# Patient Record
Sex: FEMALE | Race: WHITE | NOT HISPANIC OR LATINO
[De-identification: names, ages, dates, MRNs, and addresses within clinical notes are randomized per-mention and may not be internally consistent; named-entity substitution may affect disease eponyms.]

---

## 2017-08-28 ENCOUNTER — APPOINTMENT (OUTPATIENT)
Dept: OTOLARYNGOLOGY | Facility: CLINIC | Age: 72
End: 2017-08-28
Payer: MEDICARE

## 2017-08-28 VITALS
OXYGEN SATURATION: 97 % | HEART RATE: 61 BPM | SYSTOLIC BLOOD PRESSURE: 128 MMHG | DIASTOLIC BLOOD PRESSURE: 76 MMHG | TEMPERATURE: 98.6 F

## 2017-08-28 PROCEDURE — 31231 NASAL ENDOSCOPY DX: CPT

## 2017-08-28 PROCEDURE — 99203 OFFICE O/P NEW LOW 30 MIN: CPT | Mod: 25

## 2017-09-18 ENCOUNTER — APPOINTMENT (OUTPATIENT)
Dept: OTOLARYNGOLOGY | Facility: CLINIC | Age: 72
End: 2017-09-18
Payer: MEDICARE

## 2017-09-18 VITALS
HEART RATE: 64 BPM | TEMPERATURE: 98.7 F | DIASTOLIC BLOOD PRESSURE: 76 MMHG | SYSTOLIC BLOOD PRESSURE: 121 MMHG | OXYGEN SATURATION: 97 %

## 2017-09-18 DIAGNOSIS — R07.0 PAIN IN THROAT: ICD-10-CM

## 2017-09-18 DIAGNOSIS — R19.6 HALITOSIS: ICD-10-CM

## 2017-09-18 PROCEDURE — 31575 DIAGNOSTIC LARYNGOSCOPY: CPT

## 2017-09-18 PROCEDURE — 99213 OFFICE O/P EST LOW 20 MIN: CPT | Mod: 25

## 2017-09-27 ENCOUNTER — FORM ENCOUNTER (OUTPATIENT)
Age: 72
End: 2017-09-27

## 2017-09-28 ENCOUNTER — OUTPATIENT (OUTPATIENT)
Dept: OUTPATIENT SERVICES | Facility: HOSPITAL | Age: 72
LOS: 1 days | End: 2017-09-28
Payer: MEDICARE

## 2017-09-28 PROCEDURE — 76536 US EXAM OF HEAD AND NECK: CPT | Mod: 26

## 2017-09-29 ENCOUNTER — EMERGENCY (EMERGENCY)
Facility: HOSPITAL | Age: 72
LOS: 1 days | Discharge: HOME | End: 2017-09-29
Attending: PERSONAL EMERGENCY RESPONSE ATTENDANT | Admitting: PERSONAL EMERGENCY RESPONSE ATTENDANT
Payer: MEDICARE

## 2017-09-29 VITALS
TEMPERATURE: 98 F | DIASTOLIC BLOOD PRESSURE: 81 MMHG | OXYGEN SATURATION: 98 % | RESPIRATION RATE: 18 BRPM | SYSTOLIC BLOOD PRESSURE: 166 MMHG | HEART RATE: 60 BPM

## 2017-09-29 PROCEDURE — 99283 EMERGENCY DEPT VISIT LOW MDM: CPT

## 2017-09-29 RX ORDER — CEPHALEXIN 500 MG
500 CAPSULE ORAL ONCE
Qty: 0 | Refills: 0 | Status: COMPLETED | OUTPATIENT
Start: 2017-09-29 | End: 2017-09-29

## 2017-09-29 RX ORDER — CEPHALEXIN 500 MG
1 CAPSULE ORAL
Qty: 21 | Refills: 0
Start: 2017-09-29 | End: 2017-10-06

## 2017-09-29 RX ADMIN — Medication 60 MILLIGRAM(S): at 10:46

## 2017-09-29 RX ADMIN — Medication 500 MILLIGRAM(S): at 10:47

## 2017-09-29 NOTE — ED ADULT NURSE NOTE - OBJECTIVE STATEMENT
72y f pt c/o swelling/redness and itchiness to right eye s/p fig picking on wednesday; pt states woke thursday am with swelling, red and itch; pt denies drainage; no crusting; no vision changes; pt has full range of motion of eye; + perrl #3; aox3; no resp distress; no diff breath; no diff swallow; no throat closing; no chest pain; no abd pain; no n/v/d; no fever/chills; pt has lesions to neck and and bilat arms; pt neighbor a doctor and gave hydrocortisone cream did not relieve

## 2017-09-29 NOTE — ED PROVIDER NOTE - PLAN OF CARE
1.  Follow-up with opthalmology in 2-3 days.  [phone]  2.  Follow-up with primary care in 3-5 days.  3.  Follow-up with derm in November as planned.  4.  Return to ED immediately for fevers, pain with eye movement, worsening sxs, nausea, vomiting, severe headache, change in vision.

## 2017-09-29 NOTE — ED PROVIDER NOTE - CARE PLAN
Principal Discharge DX:	Poison ivy dermatitis  Instructions for follow-up, activity and diet:	1.  Follow-up with opthalmology in 2-3 days.  [phone]  2.  Follow-up with primary care in 3-5 days.  3.  Follow-up with derm in November as planned.  4.  Return to ED immediately for fevers, pain with eye movement, worsening sxs, nausea, vomiting, severe headache, change in vision.  Secondary Diagnosis:	Cellulitis, unspecified cellulitis site

## 2017-09-29 NOTE — ED PROVIDER NOTE - PHYSICAL EXAMINATION
Periorbital swelling around R eye.  No pain with eye movement in all directions, pupils equal and reactive.  + Direct and consensual reflexes.  No induration/abscess formation.  Soft tissue swelling with erythema.  L anterior neck with focus of vesicles with crusting/erythema c/w poison ivy.  Small sites of vesicles in linear/scratch pattern to R forearm L forearm.  Remains neurovascularly intact in all distal extremities. Periorbital swelling around R eye.  No pain with eye movement in all directions, pupils equal and reactive.  + Direct and consensual reflexes.  No induration/abscess formation.  Soft tissue swelling with erythema.  L anterior neck with focus of vesicles with crusting/erythema c/w poison ivy.  Small sites of vesicles in linear/scratch pattern to R forearm L forearm.  Remains neurovascularly intact in all distal extremities.  Clear breath sounds bilaterally.

## 2017-09-29 NOTE — ED PROVIDER NOTE - MEDICAL DECISION MAKING DETAILS
[place]:  Pt with contact dermatitis c/w poison ivy.  R peirorbital edema c/w preseptal cellulitis.  Lack of pain with eye movement inconsistent with orbital cellulitis.  No fevers/chills.  Pt advised that she will be txed with steroid taper, keflex for concern for bacterial superinfection surrounding R eye.  No visual changes, no drainage from R eye.  Non-contact lens wearer.  Advised to follow-up with opthalmology as oupt in 2-3 days, f/u with PCP and derm as outpt.  Return to ED immediately for fevers, pain with eye movement, worsening sxs, nausea, vomiting, severe headache, change in vision.

## 2017-09-29 NOTE — ED PROVIDER NOTE - OBJECTIVE STATEMENT
Attending MD Rodriguez.  Pt is a 72 yr old female with PMhx of melanoma untxed without chemo but excised, HTN, R hip replacement/R rotator cuff repair presenting to ED with complaint of R eye itching since Wednesday night.  By Thursday morning R eye swollen, edematous, itchy and painful.  Next door neighbor is a doctor who gave her hydrocortisone cream which did not help.  Pt went ‘fig picking’ on Wednesday morning and sxs began Wednesday night.  Now has lesions to L side of neck, bilateral neck, bilateral forearms and mid lower back.  Pt believes she may have poison Ivy as there was poison Renita around the figs.  No vision changes, no discharge.  No upper resp sxs, no sore throat.  No CP/SOB, abdominal pain, urinary or bowel changes.  ROS positive only for itching and mild cough she states is ‘allergies’.  Pt able to range both eyes without pain on EOM.  No drainage from eye.  No oral lesions/vesicles.

## 2017-10-17 ENCOUNTER — FORM ENCOUNTER (OUTPATIENT)
Age: 72
End: 2017-10-17

## 2017-10-18 ENCOUNTER — RESULT REVIEW (OUTPATIENT)
Age: 72
End: 2017-10-18

## 2017-10-18 PROCEDURE — 76942 ECHO GUIDE FOR BIOPSY: CPT | Mod: 26

## 2017-10-18 PROCEDURE — 76942 ECHO GUIDE FOR BIOPSY: CPT

## 2017-10-18 PROCEDURE — 76536 US EXAM OF HEAD AND NECK: CPT

## 2017-10-18 PROCEDURE — 10022: CPT

## 2017-10-18 PROCEDURE — 88173 CYTOPATH EVAL FNA REPORT: CPT

## 2017-10-19 LAB — NON-GYNECOLOGICAL CYTOLOGY STUDY: SIGNIFICANT CHANGE UP

## 2017-10-26 ENCOUNTER — APPOINTMENT (OUTPATIENT)
Dept: OTOLARYNGOLOGY | Facility: CLINIC | Age: 72
End: 2017-10-26
Payer: MEDICARE

## 2017-10-26 VITALS
TEMPERATURE: 97.9 F | OXYGEN SATURATION: 96 % | SYSTOLIC BLOOD PRESSURE: 139 MMHG | HEART RATE: 72 BPM | DIASTOLIC BLOOD PRESSURE: 75 MMHG

## 2017-10-26 PROCEDURE — 99213 OFFICE O/P EST LOW 20 MIN: CPT

## 2018-08-22 ENCOUNTER — FORM ENCOUNTER (OUTPATIENT)
Age: 73
End: 2018-08-22

## 2019-02-25 ENCOUNTER — APPOINTMENT (OUTPATIENT)
Dept: PULMONOLOGY | Facility: CLINIC | Age: 74
End: 2019-02-25

## 2019-03-06 PROBLEM — I10 ESSENTIAL (PRIMARY) HYPERTENSION: Chronic | Status: ACTIVE | Noted: 2017-09-29

## 2019-07-02 ENCOUNTER — APPOINTMENT (OUTPATIENT)
Dept: ENDOCRINOLOGY | Facility: CLINIC | Age: 74
End: 2019-07-02
Payer: MEDICARE

## 2019-07-02 VITALS
SYSTOLIC BLOOD PRESSURE: 124 MMHG | BODY MASS INDEX: 29.63 KG/M2 | HEIGHT: 62 IN | DIASTOLIC BLOOD PRESSURE: 78 MMHG | OXYGEN SATURATION: 98 % | WEIGHT: 161 LBS | HEART RATE: 79 BPM

## 2019-07-02 DIAGNOSIS — E04.2 NONTOXIC MULTINODULAR GOITER: ICD-10-CM

## 2019-07-02 DIAGNOSIS — K80.20 CALCULUS OF GALLBLADDER W/OUT CHOLECYSTITIS W/OUT OBSTRUCTION: ICD-10-CM

## 2019-07-02 DIAGNOSIS — E04.9 NONTOXIC GOITER, UNSPECIFIED: ICD-10-CM

## 2019-07-02 DIAGNOSIS — M81.0 AGE-RELATED OSTEOPOROSIS W/OUT CURRENT PATHOLOGICAL FRACTURE: ICD-10-CM

## 2019-07-02 PROCEDURE — 99204 OFFICE O/P NEW MOD 45 MIN: CPT

## 2019-07-02 NOTE — PHYSICAL EXAM
[Well Nourished] : well nourished [No Acute Distress] : no acute distress [Alert] : alert [Well Developed] : well developed [EOMI] : extra ocular movement intact [Thyroid Not Enlarged] : the thyroid was not enlarged [No Respiratory Distress] : no respiratory distress [Normal Rate and Effort] : normal respiratory rhythm and effort [No Accessory Muscle Use] : no accessory muscle use [Clear to Auscultation] : lungs were clear to auscultation bilaterally [Normal Rate] : heart rate was normal  [Normal S1, S2] : normal S1 and S2 [Regular Rhythm] : with a regular rhythm [Normal Bowel Sounds] : normal bowel sounds [Not Tender] : non-tender [Soft] : abdomen soft [No Motor Deficits] : the motor exam was normal [Normal Insight/Judgement] : insight and judgment were intact [Normal Gait] : normal gait [Normal Affect] : the affect was normal [Normal Mood] : the mood was normal

## 2019-07-02 NOTE — CONSULT LETTER
[Dear  ___] : Dear  [unfilled], [Consult Letter:] : I had the pleasure of evaluating your patient, [unfilled]. [Please see my note below.] : Please see my note below. [Sincerely,] : Sincerely, [Consult Closing:] : Thank you very much for allowing me to participate in the care of this patient.  If you have any questions, please do not hesitate to contact me. [FreeTextEntry3] : Kelly Martin MD

## 2019-07-02 NOTE — REASON FOR VISIT
[Initial Eval - Existing Diagnosis] : an initial evaluation of an existing diagnosis [FreeTextEntry1] : Hashimoto's Disease

## 2019-07-02 NOTE — DATA REVIEWED
[FreeTextEntry1] : EXAM: US FNA # \par \par PROCEDURE DATE: 10/18/2017 \par \par INTERPRETATION: Exam: Ultrasound-guided fine needle aspiration of thyroid \par nodule performed on 10/18/2017 12:05 PM \par \par Indication: Left thyroid nodule. \par \par Correlation was made with ultrasound thyroid exam 9/20/2017 \par \par Findings and procedure: \par Informed consent was obtained with discussion of the risks, benefits, and \par alternatives of the procedure. \par \par Preliminary ultrasound images confirm the presence of a 1.1 x 0.6 cm solid \par nodule in the superior pole. Decision was made to proceed with FNA of this \par nodule. \par \par The patient was prepped and treatment in sterile fashion. Using sterile \par technique and ultrasound guidance, 1 cc of 1% lidocaine was injected into \par the overlying subcutaneous tissues. The nodule was subsequently aspirated by \par agitation. A total of 2 passes were made utilizing a 25-gauge needles, with \par needle tip position within the nodule confirmed by US at each pass. The \par resulting specimens were deemed adequate by the The cytotechnologist, and \par sent to cytology for testing. \par \par The patient tolerated the procedure well without evidence of complication. \par The patient was discharged with instructions to contact her ordering \par physician in 5-7 days in order to obtain the biopsy results. \par \par IMPRESSION: \par Uncomplicated ultrasound guided fine needle aspiration of left thyroid lobe \par nodule, as above. Results will follow from cytology. \par \par ACCESSION No:  [id number]\par \par CORNELIA HARTLEY                          2\par \par \par \par Cytopathology Report\par \par \par \par \par \par Final Diagnosis\par THYROID GLAND, LEFT LOBE UPPER POLE; FNA:\par \par BENIGN FINDINGS ([place] Category II).\par Benign follicular cells, some with oncocytic change in a\par background of colloid, consistent with a benign follicular\par nodule.\par \par The [place] System for Reporting Thyroid Cytopathology: Implied\par Risk of Malignancy and Recommended Clinical Management **\par \par Diagnostic Category Risk of Malignancy (%) Usual Management*\par \par I. Non Diagnostic 1-4 Repeat FNA with US guidance\par \par II. Benign 0-3 Clinical follow up\par \par III. Atypia of undetermined significance\par 5-15 Repeat FNA\par IV. Suspicious for a follicular neoplasm  or\par Suspicious for Hurthle cell neoplasm 15-30 Surgical\par consultation ( surgical lobectomy)\par V. Suspicious for Malignancy 60-75 Surgical consultation (Near-\par total thyroidectomy or surgical lobectomy)\par \par VI. Malignant 97-99 Surgical consultation (Near-total\par thyroidectomy)\par \par \par *Actual management may depend on other factors (e.g. clinical,\par sonographic) besides the FNA interpretation.\par **Adapted from Arely DENIS, Roshan S - Am J Clin Pathol 2009; 132: 658-\par 665\par \par ALLI Levy(ASCP)\par Hemant Cohn M.D.\par (Electronic Signature)\par Reported on: 10/19/17\par ________________________________________________________________\par \par \par Specimen Description\par THYROID, LEFT, FNA\par \par \par \par \par \par \par \par CORNELIA HARTLEY                          2\par \par \par \par Cytopathology Report\par \par \par \par \par \par Statement of Adequacy\par Satisfactory for interpretation.\par \par \par Clinical Information\par Left lobe upper pole nodule, hashimoto  (1.1 x 0.6 x 1.1cm nodule\par )\par Pass 1,2 Adequate. Adequacy given to Dr. Ruby by TAS\par 10/18/2017\par \par \par Gross Description\par Received: 30  ml of bloody fluid received in CytoLyt, 2slides air\par dried, 2 slides fixed in alcohol\par Prepared: 1 Thin Prep slide, 2 DQ, 2 Pap\par

## 2019-07-02 NOTE — HISTORY OF PRESENT ILLNESS
[FreeTextEntry1] : Patient is a 74 yo woman with history of thyroid nodule and osteoarthritis establishing new endocrine care\par \par Hashimoto's Disease diagnosed a few years ago.  Never been on thyroid medication but was told in the past she had it.  Used to see a few endocrinologists but not happy with care.  States she is losing hair at a rapid speed.  Was seen by dermatology and was told that she had alopecia.  Used to dye hair, but used to in the past.  Does not use a hair dryer.  Bowel movements are regular. Uses minoxidil- started months ago. Some cold intolerance\par Family history of thyroid cancer\par Takes fish oil, vitamin D\par Was taking biotin for some time in the past but stopped\par Diet: good, healthy; no sweet tooth. Eats meat, vegetable heavy diet\par No lithium/amiodarone/radiation exposures\par \par Patient with thyroid USGFNA October 2017 of a left upper pole thyroid nodule measuring 1.1 cm. The result was benign, [place] Category II. Has not had a surveillance thyroid US since 2017\par \par Osteoporosis: has an appointment with GYN for DXA.  No clinical fractures.  Menopause was at age 43, early\par History of right hip replacement for arthritis

## 2019-07-02 NOTE — ASSESSMENT
[FreeTextEntry1] : Patient is a 74 yo woman with hx of Hashimoto's Disease, thyroid nodules here to establish endocrine care\par \par 1. Thyroid nodule\par -patient had USGFNA in 2017, benign findings\par -no compressive symptoms\par -repeat surveillance thyroid US this year\par -referral provided\par -check TFT's including TPO antibodies\par -has hair loss and mild cold intolerance. She uses minoxidil for that at this time with minimal improvement\par \par 2. Post-menopausal osteoporosis\par -has screening DXA with GYN this year\par -asked to send over the results\par \par Follow up in 6 months

## 2019-07-02 NOTE — REVIEW OF SYSTEMS
[Hair Loss] : hair loss [Negative] : Eyes [All other systems negative] : All other systems negative [Fatigue] : no fatigue [Dysphagia] : no dysphagia [Decreased Appetite] : appetite not decreased [Dysphonia] : no dysphonia [Nasal Congestion] : no nasal congestion [Neck Pain] : no neck pain [Chest Pain] : no chest pain [Palpitations] : no palpitations [Heart Rate Is Slow] : the heart rate was not slow [Shortness Of Breath] : no shortness of breath [Heart Rate Is Fast] : the heart rate was not fast [Cough] : no cough [Wheezing] : no wheezing was heard [SOB on Exertion] : no shortness of breath during exertion [Nausea] : no nausea [Vomiting] : no vomiting was observed [Constipation] : no constipation [Diarrhea] : no diarrhea [Joint Pain] : no joint pain [Joint Stiffness] : no joint stiffness [Anxiety] : no anxiety [Depression] : no depression [Heat Intolerance] : heat tolerant [Cold Intolerance] : cold intolerant [As Noted in HPI] : as noted in HPI

## 2019-07-03 LAB
25(OH)D3 SERPL-MCNC: 26 NG/ML
ALBUMIN SERPL ELPH-MCNC: 4.5 G/DL
ALP BLD-CCNC: 52 U/L
ALT SERPL-CCNC: 14 U/L
ANION GAP SERPL CALC-SCNC: 12 MMOL/L
AST SERPL-CCNC: 13 U/L
BILIRUB SERPL-MCNC: 0.4 MG/DL
BUN SERPL-MCNC: 23 MG/DL
CALCIUM SERPL-MCNC: 10.2 MG/DL
CHLORIDE SERPL-SCNC: 102 MMOL/L
CO2 SERPL-SCNC: 25 MMOL/L
CREAT SERPL-MCNC: 0.75 MG/DL
ESTIMATED AVERAGE GLUCOSE: 97 MG/DL
GLUCOSE SERPL-MCNC: 107 MG/DL
HBA1C MFR BLD HPLC: 5 %
POTASSIUM SERPL-SCNC: 5 MMOL/L
PROT SERPL-MCNC: 6.5 G/DL
SODIUM SERPL-SCNC: 139 MMOL/L
T3 SERPL-MCNC: 87 NG/DL
T4 FREE SERPL-MCNC: 1.2 NG/DL
TSH SERPL-ACNC: 2.75 UIU/ML

## 2019-07-05 ENCOUNTER — RESULT REVIEW (OUTPATIENT)
Age: 74
End: 2019-07-05

## 2019-07-05 LAB
THYROGLOB AB SERPL-ACNC: <20 IU/ML
THYROPEROXIDASE AB SERPL IA-ACNC: <10 IU/ML

## 2019-07-11 ENCOUNTER — RESULT CHARGE (OUTPATIENT)
Age: 74
End: 2019-07-11

## 2019-10-08 ENCOUNTER — OUTPATIENT (OUTPATIENT)
Dept: OUTPATIENT SERVICES | Facility: HOSPITAL | Age: 74
LOS: 1 days | End: 2019-10-08
Payer: MEDICARE

## 2019-10-08 DIAGNOSIS — D49.0 NEOPLASM OF UNSPECIFIED BEHAVIOR OF DIGESTIVE SYSTEM: ICD-10-CM

## 2019-10-08 DIAGNOSIS — K80.50 CALCULUS OF BILE DUCT WITHOUT CHOLANGITIS OR CHOLECYSTITIS WITHOUT OBSTRUCTION: ICD-10-CM

## 2019-10-08 DIAGNOSIS — K80.20 CALCULUS OF GALLBLADDER WITHOUT CHOLECYSTITIS WITHOUT OBSTRUCTION: ICD-10-CM

## 2019-10-08 PROCEDURE — 43274 ERCP DUCT STENT PLACEMENT: CPT

## 2019-10-08 PROCEDURE — C2617: CPT

## 2019-10-08 PROCEDURE — C1769: CPT

## 2019-10-08 PROCEDURE — C1889: CPT

## 2019-10-08 PROCEDURE — C1773: CPT

## 2019-10-08 PROCEDURE — 74330 X-RAY BILE/PANC ENDOSCOPY: CPT

## 2019-10-08 PROCEDURE — 43264 ERCP REMOVE DUCT CALCULI: CPT

## 2020-01-07 ENCOUNTER — APPOINTMENT (OUTPATIENT)
Dept: ENDOCRINOLOGY | Facility: CLINIC | Age: 75
End: 2020-01-07

## 2020-11-16 ENCOUNTER — APPOINTMENT (OUTPATIENT)
Dept: ORTHOPEDIC SURGERY | Facility: CLINIC | Age: 75
End: 2020-11-16
Payer: MEDICARE

## 2020-11-16 VITALS — DIASTOLIC BLOOD PRESSURE: 77 MMHG | HEART RATE: 77 BPM | SYSTOLIC BLOOD PRESSURE: 128 MMHG

## 2020-11-16 DIAGNOSIS — M51.36 OTHER INTERVERTEBRAL DISC DEGENERATION, LUMBAR REGION: ICD-10-CM

## 2020-11-16 PROCEDURE — 99203 OFFICE O/P NEW LOW 30 MIN: CPT

## 2020-11-16 PROCEDURE — 72100 X-RAY EXAM L-S SPINE 2/3 VWS: CPT

## 2020-11-16 PROCEDURE — 72170 X-RAY EXAM OF PELVIS: CPT

## 2020-11-23 NOTE — PHYSICAL EXAM
[de-identified] : Oriented to time, place, person\par Mood: Normal\par Affect: Normal\par Appearance: Healthy, well appearing, no acute distress.\par Gait: Normal\par Assistive Devices: None \par \par Lumbar Spine Exam:\par \par Inspection: No lesions, no obvious deformity\par Palpation: No midline tenderness palpation, step-offs, or skin lesions.  Mild tenderness to palpation of the sciatic notch bilaterally.\par ROM: Restricted range of motion with respect to flexion, extension, lateral bending, and rotation.\par Strength" 5/5 IP/hip abductors/hip adductors/Q/H/EHL/TA/GS\par Hip ROM: No pain with log roll, passive internal/external rotation of the hips.\par Other tests: Negative straight leg raise, negative femoral stretch.\par Sensation: Grossly intact sensation to light touch bilateral lower extremities.\par Reflexes: 1+ patellar and Achilles reflexes. Downgoing Babinski.  Negative clonus.\par Pulses: 2+ DP and PT pulses.  [de-identified] : The following radiographs were ordered and read by me during this patients visit. I reviewed each radiograph in detail with the patient and discussed the findings as highlighted below. \par \par AP pelvis and lateral view of the right hip were obtained today, 11/16/2020, that show prior total hip arthroplasty. No acute bony injury, including fracture or dislocation. There is no hip degenerative change seen. There is no gross pelvic of hip malalignment. No significant other obvious osseous abnormality, otherwise unremarkable. \par \par 2 views of the lower lumbar were obtained today, 11/16/2020, that show no acute fracture or dislocation. There is degenerative disc disease at L5-S1 seen. There is no gross malalignment. No significant other obvious osseous abnormality, otherwise unremarkable.

## 2020-11-23 NOTE — ADDENDUM
[FreeTextEntry1] : This note was written by Terese High on 11/16/2020 acting solely as a scribe for Dr. Vern Morocho.\par \par All medical record entries made by the Scribe were at my, Dr. Vern Morocho, direction and personally dictated by me on 11/16/2020. I have personally reviewed the chart and agree that the record accurately reflects my personal performance of the history, physical exam, assessment and plan.

## 2020-11-23 NOTE — DISCUSSION/SUMMARY
[de-identified] : 76 y/o female with right leg pain. \par \par On clinical examination the patient's right hip appears minimally involved.  Range of motion of function appears normal.  Patient does have some radicular component throughout the lower extremity that is consistent with radiation of nerve pain.  Recommendation would be for continued observation and symptomatic management at this time.. \par \par Recommendations: Conservative care & observation, this includes rest/activity avoidance until less symptomatic with subsequent gradual return to full activity as tolerated. Patient may also use OTC NSAID's or acetaminophen as tolerated, with application of heat to the area 2-3x daily for 20 minutes after periods of activity. \par \par Follow-up orthopedic spine if persistent symptoms.

## 2020-11-23 NOTE — HISTORY OF PRESENT ILLNESS
[de-identified] : 75 year old female history of lumbar radiculopathy and right hip total arthroplasty 9 years ago, presents today with right hip and lower back pain since March 2020. She recalls sustained a fall in January on her right side but did not developed thigh pain until  March. The pain is brought on with prolonged walking and standing. Reports buckling sensation. Denies numbness, tingling or radiculopathy. Reports buttocks pain. \par \par The patient's past medical history, past surgical history, medications and allergies were reviewed by me today with the patient and documented accordingly. In addition, the patient's family and social history, which were noncontributory to this visit, were reviewed also.

## 2021-07-20 ENCOUNTER — EMERGENCY (EMERGENCY)
Facility: HOSPITAL | Age: 76
LOS: 1 days | Discharge: HOME | End: 2021-07-20
Attending: STUDENT IN AN ORGANIZED HEALTH CARE EDUCATION/TRAINING PROGRAM
Payer: MEDICARE

## 2021-07-20 VITALS
HEART RATE: 64 BPM | WEIGHT: 164.91 LBS | RESPIRATION RATE: 19 BRPM | TEMPERATURE: 98 F | HEIGHT: 62 IN | SYSTOLIC BLOOD PRESSURE: 142 MMHG | DIASTOLIC BLOOD PRESSURE: 62 MMHG | OXYGEN SATURATION: 97 %

## 2021-07-20 VITALS
DIASTOLIC BLOOD PRESSURE: 65 MMHG | OXYGEN SATURATION: 98 % | HEART RATE: 68 BPM | RESPIRATION RATE: 15 BRPM | SYSTOLIC BLOOD PRESSURE: 148 MMHG

## 2021-07-20 PROCEDURE — 96374 THER/PROPH/DIAG INJ IV PUSH: CPT | Mod: XU

## 2021-07-20 PROCEDURE — 73020 X-RAY EXAM OF SHOULDER: CPT

## 2021-07-20 PROCEDURE — 99284 EMERGENCY DEPT VISIT MOD MDM: CPT

## 2021-07-20 PROCEDURE — 73060 X-RAY EXAM OF HUMERUS: CPT | Mod: 26,RT

## 2021-07-20 PROCEDURE — 73030 X-RAY EXAM OF SHOULDER: CPT

## 2021-07-20 PROCEDURE — 73030 X-RAY EXAM OF SHOULDER: CPT | Mod: 26,RT,77

## 2021-07-20 PROCEDURE — 73060 X-RAY EXAM OF HUMERUS: CPT

## 2021-07-20 PROCEDURE — 23650 CLTX SHO DSLC W/MNPJ WO ANES: CPT | Mod: RT

## 2021-07-20 PROCEDURE — 99285 EMERGENCY DEPT VISIT HI MDM: CPT | Mod: 25

## 2021-07-20 PROCEDURE — 73030 X-RAY EXAM OF SHOULDER: CPT | Mod: 26,RT

## 2021-07-20 PROCEDURE — 73070 X-RAY EXAM OF ELBOW: CPT

## 2021-07-20 PROCEDURE — 73070 X-RAY EXAM OF ELBOW: CPT | Mod: 26,RT

## 2021-07-20 RX ORDER — IBUPROFEN 200 MG
600 TABLET ORAL ONCE
Refills: 0 | Status: COMPLETED | OUTPATIENT
Start: 2021-07-20 | End: 2021-07-20

## 2021-07-20 RX ORDER — OXYCODONE HYDROCHLORIDE 5 MG/1
1 TABLET ORAL
Qty: 8 | Refills: 0
Start: 2021-07-20

## 2021-07-20 RX ORDER — OXYCODONE HYDROCHLORIDE 5 MG/1
5 TABLET ORAL ONCE
Refills: 0 | Status: DISCONTINUED | OUTPATIENT
Start: 2021-07-20 | End: 2021-07-20

## 2021-07-20 RX ORDER — SODIUM CHLORIDE 9 MG/ML
1000 INJECTION INTRAMUSCULAR; INTRAVENOUS; SUBCUTANEOUS ONCE
Refills: 0 | Status: COMPLETED | OUTPATIENT
Start: 2021-07-20 | End: 2021-07-20

## 2021-07-20 RX ORDER — PROPOFOL 10 MG/ML
70 INJECTION, EMULSION INTRAVENOUS ONCE
Refills: 0 | Status: COMPLETED | OUTPATIENT
Start: 2021-07-20 | End: 2021-07-20

## 2021-07-20 RX ADMIN — OXYCODONE HYDROCHLORIDE 5 MILLIGRAM(S): 5 TABLET ORAL at 16:09

## 2021-07-20 RX ADMIN — SODIUM CHLORIDE 1000 MILLILITER(S): 9 INJECTION INTRAMUSCULAR; INTRAVENOUS; SUBCUTANEOUS at 16:50

## 2021-07-20 RX ADMIN — PROPOFOL 70 MILLIGRAM(S): 10 INJECTION, EMULSION INTRAVENOUS at 16:33

## 2021-07-20 RX ADMIN — SODIUM CHLORIDE 1000 MILLILITER(S): 9 INJECTION INTRAMUSCULAR; INTRAVENOUS; SUBCUTANEOUS at 16:51

## 2021-07-20 RX ADMIN — OXYCODONE HYDROCHLORIDE 5 MILLIGRAM(S): 5 TABLET ORAL at 14:23

## 2021-07-20 RX ADMIN — Medication 600 MILLIGRAM(S): at 16:09

## 2021-07-20 RX ADMIN — Medication 600 MILLIGRAM(S): at 12:43

## 2021-07-20 NOTE — ED ADULT NURSE NOTE - NSIMPLEMENTINTERV_GEN_ALL_ED
Implemented All Fall Risk Interventions:  [place] to call system. Call bell, personal items and telephone within reach. Instruct patient to call for assistance. Room bathroom lighting operational. Non-slip footwear when patient is off stretcher. Physically safe environment: no spills, clutter or unnecessary equipment. Stretcher in lowest position, wheels locked, appropriate side rails in place. Provide visual cue, wrist band, yellow gown, etc. Monitor gait and stability. Monitor for mental status changes and reorient to person, place, and time. Review medications for side effects contributing to fall risk. Reinforce activity limits and safety measures with patient and family.

## 2021-07-20 NOTE — ED PROVIDER NOTE - UPPER EXTREMITY EXAM, RIGHT
+ ttp to humerus and shoulder, decreased ROM of elbow/shoulder 2/2 pain, no elbow bony ttp, no clavicular ttp, no ttp of hand/wrist/forearm and full ROM of hand/wrist intact, radial pulse 2+, cap refill <3s

## 2021-07-20 NOTE — ED PROVIDER NOTE - CARE PROVIDER_API CALL
Johnny Cunha)  Orthopaedic Surgery  [address] [place] [address], [address]  [place] [address]  Phone: [phone]  Fax: [phone]  Follow Up Time: Urgent

## 2021-07-20 NOTE — ED PROVIDER NOTE - NSFOLLOWUPINSTRUCTIONS_ED_ALL_ED_FT
Keep sling in place as instructed and do not bear weight on right arm.     Apply ice 20mins on, 40mins off in cycle for first 24-48 hours.    Take ibuprofen 600mg every 8hrs for pain as needed. Take with food.   May alternate with Tylenol 650mg every 6-8 hours as needed for pain.     Take oxycodone 5mg every 8 hours as needed for severe pain *** caution SIDE EFFECT of drowsiness - DO NOT drive or drink alcohol while taking this medication.     Follow up with Orthopedic Dr. Cunha tomorrow in the office. Call [phone] to make an appointment    Return to ED for worsening pain, fever, weakness, numbness, or any other concerning symptoms.

## 2021-07-20 NOTE — ED PROVIDER NOTE - OBJECTIVE STATEMENT
77yo F with PMH HTN, R hand dominant, presenting with R upper arm pain s/p trip and fall this morning. Pt reports she tripped over a dog at the bottom of her stairs, fell onto R side. No head injury, no LOC. Reports pain to R upper arm and shoulder. Took two Tylenol arthritis 2 hours ago and reports some improvement of pain. Denies any numbness/tingling, lacerations/abrasions, any other injury.

## 2021-07-20 NOTE — ED PROVIDER NOTE - PROGRESS NOTE DETAILS
xray with fracture and dislocation - ortho will come to reduce dislocation.  Will set up for possible conscious sedation.  - Kae Hanson DO Shoulder reduction performed by ortho under conscious sedation in ED. Pt tolerated well. Sling in place. Cleared by ortho to f/u in office tomorrow. - Glo Saab PA-C Tolerating PO, feeling well, stable for d/c. - Glo Saab PA-C

## 2021-07-20 NOTE — ED PROVIDER NOTE - NEUROLOGICAL, MLM
Alert and oriented, no focal deficits, no motor or sensory deficits. Alert and oriented, no focal deficits, no sensory deficits.

## 2021-07-20 NOTE — ED ADULT TRIAGE NOTE - CHIEF COMPLAINT QUOTE
R shoulder pain/injury s/p mechanical fall this morning. Pt denies LOC, hitting her head, anti-coagulant use.

## 2021-07-20 NOTE — ED PROCEDURE NOTE - NS_POSTPROCCAREGUIDE_ED_ALL_ED
Patient is now fully awake, with vital signs and temperature stable, hydration is adequate, patients Misa’s  score is at baseline (or greater than 8), patient and escort has received  discharge education.

## 2021-07-20 NOTE — CONSULT NOTE ADULT - SUBJECTIVE AND OBJECTIVE BOX
77yo RHD Female PMH of HTN, R shoulder rotator cuff surgery 10 years ago with Dr. Lindsey, c/o R shoulder pain s/p mechanical fall. She tripped over a dog at the bottom of the stairs landing on her right arm. Patient denies head hit or LOC. Patient denies numbness or tingling in the RUE. Patient denies any other injuries.    PMH:  Hypertension      PSH: R rotator cuff surgery by Dr. Lindsey 10 years ago    AH: NKDA    Meds: See med rec    T(C): 36.7 (07-20-21 @ 11:42)  HR: 69 (07-20-21 @ 16:48)  BP: 161/91 (07-20-21 @ 16:48)  RR: 15 (07-20-21 @ 16:48)  SpO2: 100% (07-20-21 @ 16:48)  Wt(kg): --    PE RUE:  Skin intact, positive sulcus sign, no erythema or ecchymosis   SILT axillary/med/rad/ulnar  +Motor AIN/PIN/Ulnar/Radial/Musc/Median,   +painless elbow/wrist ROM,   shoulder ROM limited 2/2 pain,   2+radial pulse, soft compartments.    Secondary:  No TTP over bony landmarks, SILT BL, ROM intact BL, distal pulses palpable.    Imaging:  XR demonstrating right shoulder anterior dislocation with fracture of the greater tuberosity    Procedure:  The patient underwent conscious sedation performed by the emergency department attending physician without complication. The patient underwent closed reduction and placement of a sling and swathe. Post-procedure imaging demonstrates appropriate alignment. Patient neurovascularly intact post-procedure.     76yFemale with fracture dislocation of the right shoulder s/p closed reduction and sling and swathe.     pain control  NWB in sling and swathe  PT/OT  Ortho to sign off  Please call if you have further question  Follow up with Dr. Cunha tomorrow in the office. Call [phone] to make an appointment    Ortho [id number]

## 2021-07-20 NOTE — ED ADULT NURSE NOTE - OBJECTIVE STATEMENT
77 y/o female presents to ed s/p mechanical fall after tripping over dog. C/o right arm, hip and leg pian. Denies chest pain, sob, ha, n/v/d, abdominal pain, f/c, urinary symptoms, hematuria. A&Ox4, vss, skin warm dry and intact, MAEx4, lungs CTA, abd soft nondistended. Pt resting comfortably with VSS, no complaints at this time. Patient's bed in the lowest position, explained plan of care to patient and family members. Will continue to reassess.

## 2021-07-20 NOTE — ED ADULT TRIAGE NOTE - PAIN: PRESENCE, MLM
PSR:   Patient would like to know if there is any appointment available earlier than 09/14/2020.     Koh:   Medication request currently not on active medication list. Called patient to clarify if she is taking Levothyroxine 125 mcg or Tirosint 137. Patient is currently taking Tirosint 137 mcg. Her PCP has been monitoring her thyroid until she is able to see Endo. Per patient she is feeling very good taking the Tirosint since it is gluten free. Please advise.    complains of pain/discomfort

## 2021-07-20 NOTE — ED PROVIDER NOTE - CLINICAL SUMMARY MEDICAL DECISION MAKING FREE TEXT BOX
75yo F with PMH HTN, R hand dominant, presenting with R upper arm pain s/p trip and fall this morning. Pt reports she tripped over a dog at the bottom of her stairs, fell onto R side. No head injury, no LOC. Reports pain to R upper arm and shoulder.  +deformity to right shoulder, distal pulses and  strength in tact.  Concern for fx vs dislocation - will obrtian xrays, give pain control and reassess.  - Kae Hanson, DO

## 2021-07-20 NOTE — ED PROVIDER NOTE - PATIENT PORTAL LINK FT
You can access the FollowMyHealth Patient Portal offered by [facility] by registering at the following website: [email or web address]. By joining [facility] [employer]’s FollowMyHealth portal, you will also be able to view your health information using other applications (apps) compatible with our system.

## 2021-07-20 NOTE — ED PROVIDER NOTE - CARE PLAN
Principal Discharge DX:	Shoulder dislocation, right, initial encounter  Secondary Diagnosis:	Closed displaced fracture of greater tuberosity of right humerus, initial encounter

## 2022-07-06 NOTE — ED ADULT NURSE NOTE - FINAL NURSING ELECTRONIC SIGNATURE
20-Jul-2021 18:50 Libtayo Counseling- I discussed with the patient the risks of Libtayo including but not limited to nausea, vomiting, diarrhea, and bone or muscle pain.  The patient verbalized understanding of the proper use and possible adverse effects of Libtayo.  All of the patient's questions and concerns were addressed.

## 2022-11-09 ENCOUNTER — OUTPATIENT (OUTPATIENT)
Dept: OUTPATIENT SERVICES | Facility: HOSPITAL | Age: 77
LOS: 1 days | End: 2022-11-09
Payer: MEDICARE

## 2022-11-09 DIAGNOSIS — Z20.828 CONTACT WITH AND (SUSPECTED) EXPOSURE TO OTHER VIRAL COMMUNICABLE DISEASES: ICD-10-CM

## 2022-11-09 LAB — SARS-COV-2 RNA SPEC QL NAA+PROBE: SIGNIFICANT CHANGE UP

## 2022-11-09 PROCEDURE — U0005: CPT

## 2022-11-09 PROCEDURE — U0003: CPT

## 2022-11-10 ENCOUNTER — TRANSCRIPTION ENCOUNTER (OUTPATIENT)
Age: 77
End: 2022-11-10

## 2022-11-11 ENCOUNTER — OUTPATIENT (OUTPATIENT)
Dept: OUTPATIENT SERVICES | Facility: HOSPITAL | Age: 77
LOS: 1 days | End: 2022-11-11
Payer: MEDICARE

## 2022-11-11 ENCOUNTER — TRANSCRIPTION ENCOUNTER (OUTPATIENT)
Age: 77
End: 2022-11-11

## 2022-11-11 VITALS
TEMPERATURE: 98 F | WEIGHT: 162.7 LBS | OXYGEN SATURATION: 96 % | HEIGHT: 62 IN | RESPIRATION RATE: 14 BRPM | DIASTOLIC BLOOD PRESSURE: 56 MMHG | SYSTOLIC BLOOD PRESSURE: 129 MMHG | HEART RATE: 60 BPM

## 2022-11-11 VITALS
DIASTOLIC BLOOD PRESSURE: 83 MMHG | OXYGEN SATURATION: 98 % | SYSTOLIC BLOOD PRESSURE: 111 MMHG | HEART RATE: 67 BPM | RESPIRATION RATE: 14 BRPM

## 2022-11-11 DIAGNOSIS — Z98.49 CATARACT EXTRACTION STATUS, UNSPECIFIED EYE: Chronic | ICD-10-CM

## 2022-11-11 DIAGNOSIS — Z98.890 OTHER SPECIFIED POSTPROCEDURAL STATES: Chronic | ICD-10-CM

## 2022-11-11 DIAGNOSIS — Z41.9 ENCOUNTER FOR PROCEDURE FOR PURPOSES OTHER THAN REMEDYING HEALTH STATE, UNSPECIFIED: Chronic | ICD-10-CM

## 2022-11-11 DIAGNOSIS — Z90.49 ACQUIRED ABSENCE OF OTHER SPECIFIED PARTS OF DIGESTIVE TRACT: Chronic | ICD-10-CM

## 2022-11-11 DIAGNOSIS — H35.342 MACULAR CYST, HOLE, OR PSEUDOHOLE, LEFT EYE: ICD-10-CM

## 2022-11-11 DIAGNOSIS — Z96.649 PRESENCE OF UNSPECIFIED ARTIFICIAL HIP JOINT: Chronic | ICD-10-CM

## 2022-11-11 PROCEDURE — 67042 VIT FOR MACULAR HOLE: CPT | Mod: LT

## 2022-11-11 PROCEDURE — 67042 VIT FOR MACULAR HOLE: CPT | Mod: AS,LT

## 2022-11-11 PROCEDURE — C1889: CPT

## 2022-11-11 DEVICE — GS SF6 SULFER HEXAFLUORIDE 3 L 20GM
Type: IMPLANTABLE DEVICE | Site: LEFT | Status: NON-FUNCTIONAL
Removed: 2022-11-11

## 2022-11-11 RX ORDER — LISINOPRIL 2.5 MG/1
0 TABLET ORAL
Qty: 0 | Refills: 0 | DISCHARGE

## 2022-11-11 NOTE — ASU PATIENT PROFILE, ADULT - ABILITY TO HEAR (WITH HEARING AID OR HEARING APPLIANCE IF NORMALLY USED):
b/l hearing aid in place./Mildly to Moderately Impaired: difficulty hearing in some environments or speaker may need to increase volume or speak distinctly

## 2022-11-11 NOTE — ASU PATIENT PROFILE, ADULT - NSICDXPASTSURGICALHX_GEN_ALL_CORE_FT
PAST SURGICAL HISTORY:  Elective surgery Benign left arm tumor , bone graft from left hip.    H/O shoulder surgery right    S/P cataract surgery left eye    S/P sue     S/P ERCP with sphicterotomy with temp stent, spincterotomy, and baloon extraction of duct stones    S/P hip replacement right    Surgery, elective

## 2022-11-11 NOTE — ASU DISCHARGE PLAN (ADULT/PEDIATRIC) - CARE PROVIDER_API CALL
Janusz Wolf)  Ophthalmology  [address] [place] [address], [address]  [place] [address]  Phone: [phone]  Fax: [phone]  Scheduled Appointment: 11/12/2022 08:00 AM

## 2022-11-11 NOTE — ASU PATIENT PROFILE, ADULT - FALL HARM RISK - HARM RISK INTERVENTIONS

## 2022-11-11 NOTE — ASU DISCHARGE PLAN (ADULT/PEDIATRIC) - NS MD DC FALL RISK RISK
For information on Fall & Injury Prevention, visit: [email or web address] OR  [email or web address] OR  https://www.cdc.gov/steadi/patient.html

## 2022-11-11 NOTE — ASU PATIENT PROFILE, ADULT - VISION (WITH CORRECTIVE LENSES IF THE PATIENT USUALLY WEARS THEM):
Left eye decreased/Partially impaired: cannot see medication labels or newsprint, but can see obstacles in path, and the surrounding layout; can count fingers at arm's length

## 2022-11-11 NOTE — ASU PREOP CHECKLIST - HEART RATE (BEATS/MIN)
60 Full Thickness Lip Wedge Repair (Flap) Text: Given the location of the defect and the proximity to free margins a full thickness wedge repair was deemed most appropriate.  Using a sterile surgical marker, the appropriate repair was drawn incorporating the defect and placing the expected incisions perpendicular to the vermilion border.  The vermilion border was also meticulously outlined to ensure appropriate reapproximation during the repair.  The area thus outlined was incised through and through with a #15 scalpel blade.  The muscularis and dermis were reaproximated with deep sutures following hemostasis. Care was taken to realign the vermilion border before proceeding with the superficial closure.  Once the vermilion was realigned the superfical and mucosal closure was finished.

## 2022-11-11 NOTE — ASU PATIENT PROFILE, ADULT - NSICDXPASTMEDICALHX_GEN_ALL_CORE_FT
PAST MEDICAL HISTORY:  Bilateral hearing loss     H/O Helicobacter infection     H/O osteopenia     H/O thyroid nodule     Hypercholesterolemia     Hypertension     Melanoma left thigh    Mild asthma     HANSEL (obstructive sleep apnea) mild sleep not wearing CPAP    Osteoarthritis right hip    Pancreatic cyst     Positive PPD

## 2023-04-06 PROBLEM — C43.9 MALIGNANT MELANOMA OF SKIN, UNSPECIFIED: Chronic | Status: ACTIVE | Noted: 2022-11-11

## 2023-04-06 PROBLEM — Z86.39 PERSONAL HISTORY OF OTHER ENDOCRINE, NUTRITIONAL AND METABOLIC DISEASE: Chronic | Status: ACTIVE | Noted: 2022-11-11

## 2023-04-06 PROBLEM — K86.2 CYST OF PANCREAS: Chronic | Status: ACTIVE | Noted: 2022-11-11

## 2023-04-06 PROBLEM — H91.93 UNSPECIFIED HEARING LOSS, BILATERAL: Chronic | Status: ACTIVE | Noted: 2022-11-11

## 2023-04-06 PROBLEM — R76.11 NONSPECIFIC REACTION TO TUBERCULIN SKIN TEST WITHOUT ACTIVE TUBERCULOSIS: Chronic | Status: ACTIVE | Noted: 2022-11-11

## 2023-04-06 PROBLEM — Z86.19 PERSONAL HISTORY OF OTHER INFECTIOUS AND PARASITIC DISEASES: Chronic | Status: ACTIVE | Noted: 2022-11-11

## 2023-04-06 PROBLEM — M19.90 UNSPECIFIED OSTEOARTHRITIS, UNSPECIFIED SITE: Chronic | Status: ACTIVE | Noted: 2022-11-11

## 2023-04-06 PROBLEM — G47.33 OBSTRUCTIVE SLEEP APNEA (ADULT) (PEDIATRIC): Chronic | Status: ACTIVE | Noted: 2022-11-11

## 2023-04-06 PROBLEM — J45.909 UNSPECIFIED ASTHMA, UNCOMPLICATED: Chronic | Status: ACTIVE | Noted: 2022-11-11

## 2023-04-06 PROBLEM — E78.00 PURE HYPERCHOLESTEROLEMIA, UNSPECIFIED: Chronic | Status: ACTIVE | Noted: 2022-11-11

## 2023-04-06 PROBLEM — Z87.39 PERSONAL HISTORY OF OTHER DISEASES OF THE MUSCULOSKELETAL SYSTEM AND CONNECTIVE TISSUE: Chronic | Status: ACTIVE | Noted: 2022-11-11

## 2023-04-18 ENCOUNTER — APPOINTMENT (OUTPATIENT)
Dept: PODIATRY | Facility: CLINIC | Age: 78
End: 2023-04-18
Payer: MEDICARE

## 2023-04-18 DIAGNOSIS — M79.674 PAIN IN RIGHT TOE(S): ICD-10-CM

## 2023-04-18 PROCEDURE — 11720 DEBRIDE NAIL 1-5: CPT

## 2023-04-18 PROCEDURE — 99213 OFFICE O/P EST LOW 20 MIN: CPT | Mod: 25

## 2023-04-18 RX ORDER — MELOXICAM 15 MG/1
15 TABLET ORAL
Qty: 30 | Refills: 1 | Status: COMPLETED | COMMUNITY
Start: 2020-11-16 | End: 2023-04-18

## 2023-04-18 RX ORDER — CICLOPIROX 80 MG/ML
8 SOLUTION TOPICAL
Qty: 30 | Refills: 3 | Status: ACTIVE | COMMUNITY
Start: 2023-04-18 | End: 1900-01-01

## 2023-05-01 PROBLEM — M79.674 PAIN OF TOE OF RIGHT FOOT: Status: ACTIVE | Noted: 2023-04-26

## 2023-05-01 NOTE — HISTORY OF PRESENT ILLNESS
[Sneakers] : robert [FreeTextEntry1] : Patient returns today with a complaint of fungal, yellow, thickened nails left 1, 4 and 5.  Patient states that she has had these for many years.  She has used the topicals in the past without much improvement but she did not use it consistently.  Patient states that lately she cannot a clipper that is large enough to cut the nails; she is experiencing a lot of pain when walking in shoe gear and ends up ripping her socks.

## 2023-05-01 NOTE — PHYSICAL EXAM
[2+] : left foot dorsalis pedis 2+ [No Focal Deficits] : no focal deficits [Ankle Swelling (On Exam)] : not present [Varicose Veins Of Lower Extremities] : not present [] : not present [FreeTextEntry3] : CFT: 3 seconds x 10.  Temperature gradient: warm to cool.  [de-identified] : ROM of all pedal groups is intact, non-painful, non-crepitant.  Mild hammertoes 2 and 3, bilateral.   [FreeTextEntry1] : Light touch intact.

## 2023-05-01 NOTE — ASSESSMENT
[FreeTextEntry1] : Impression: Onychomycosis, painful (B35.1) (M79).  \par \par Treatment: Discussed etiology and treatment options.  \par Patient has a physical coming up with her PCP.  I gave her a script to obtain Liver Function panel.  Patient is interested in taking oral Lamisil.  Meanwhile, will start with topical antifungal therapy and Ciclopirox was sent to her pharmacy.  \par I advised patient to clean fomites, to decrease the fungal load in her environment. \par Nails left 1, 4 and 5 were debrided of excessive thickness and length to appropriate levels of comfort and contour using sterile nippers and Dremel.   The procedure was tolerated well without complications.\par Failure to perform this treatment based on patient's underlying condition could lead to ulceration and infection.\par Return: 3 months for routine care or earlier if she gets the Liver Function panel.  \par

## 2024-05-16 ENCOUNTER — APPOINTMENT (OUTPATIENT)
Dept: PODIATRY | Facility: CLINIC | Age: 79
End: 2024-05-16
Payer: MEDICARE

## 2024-05-16 DIAGNOSIS — M20.42 OTHER HAMMER TOE(S) (ACQUIRED), RIGHT FOOT: ICD-10-CM

## 2024-05-16 DIAGNOSIS — B35.1 TINEA UNGUIUM: ICD-10-CM

## 2024-05-16 DIAGNOSIS — M79.675 PAIN IN LEFT TOE(S): ICD-10-CM

## 2024-05-16 DIAGNOSIS — M20.41 OTHER HAMMER TOE(S) (ACQUIRED), RIGHT FOOT: ICD-10-CM

## 2024-05-16 PROCEDURE — 11720 DEBRIDE NAIL 1-5: CPT

## 2024-05-16 PROCEDURE — 99212 OFFICE O/P EST SF 10 MIN: CPT | Mod: 25

## 2024-05-21 PROBLEM — M20.41 HAMMER TOES OF BOTH FEET: Status: ACTIVE | Noted: 2024-05-21

## 2024-05-21 PROBLEM — B35.1 ONYCHOMYCOSIS: Status: ACTIVE | Noted: 2023-04-18

## 2024-05-21 PROBLEM — M79.675 PAIN OF TOE OF LEFT FOOT: Status: ACTIVE | Noted: 2023-04-26

## 2024-05-23 NOTE — PHYSICAL EXAM
[2+] : left foot dorsalis pedis 2+ [Ankle Swelling (On Exam)] : not present [Varicose Veins Of Lower Extremities] : not present [] : not present [FreeTextEntry3] : CFT: 3 seconds x 10. Temperature gradient: warm to cool. Negative hair growth.  [de-identified] : Hammertoe deformities 2 to 5 bilaterally, left greater than right. They are semi-rigid. Tenderness on manipulation examination and ROM of the PIPJ's. [FreeTextEntry1] : Epicritic sensation and light touch are intact.

## 2024-05-23 NOTE — HISTORY OF PRESENT ILLNESS
[FreeTextEntry1] : Patient presents today with complaint of pain in her left big toenail, and 4th and 5th nails left foot. She states when she made the appointment, she started having pain in the toe and discomfort. She denies any drainage or trauma to the area. Upon discussing with the patient, she states she does recall wearing a tighter pair of shoes at the time which may have been causing additional pressure to the area. Patient denies any other changes in her medical conditions or pedal complaints. She states she was prescribed antifungal medications in the past, which was a topical but did not help.

## 2024-05-23 NOTE — ASSESSMENT
[FreeTextEntry1] : Impression: Onychomycosis. Pain. Hammertoe.  Treatment: Discussed findings and conditions with the patient. Discussed pedal care. She is to avoid walking barefoot.  Appropriate shoe gear modifications were discussed with the patient for her hammertoe deformities, proper fit and size with a rounder wider toe box to accommodate the deformities. She is to avoid narrow, shallow shoes.  Discussed antifungal therapy and nail therapy with the patient. Topical vs. oral with risks and benefits. She would not like to pursue any additional treatment at this time. The left hallux nail and 4th and 5th left foot were prepped and manually and mechanically debrided to patient's tolerance and appropriate length. Height of nails was decreased and smoothed with a rotary andrea. It was recommended that the patient soak her nails in a one-to-one mixture of white vinegar and water for approximately 5-10 minutes per day and cleanse lightly around with a nail brush to loosen any subungual debris. Patient was recommended also to Lysol inside all of her shoes, wear white socks and bleach with laundry. Patient is to return in approximately 2-3 months. If there is any pain, redness, problems or concerns, she is to contact the office.

## 2024-11-01 ENCOUNTER — APPOINTMENT (OUTPATIENT)
Dept: PODIATRY | Facility: CLINIC | Age: 79
End: 2024-11-01
Payer: MEDICARE

## 2024-11-01 DIAGNOSIS — M79.675 PAIN IN LEFT TOE(S): ICD-10-CM

## 2024-11-01 DIAGNOSIS — M79.674 PAIN IN RIGHT TOE(S): ICD-10-CM

## 2024-11-01 DIAGNOSIS — B35.1 TINEA UNGUIUM: ICD-10-CM

## 2024-11-01 PROCEDURE — 11720 DEBRIDE NAIL 1-5: CPT

## (undated) DEVICE — SOL BALANCE SALT 15ML

## (undated) DEVICE — SYE-CONSTELLATION MACHINE 1003466901X: Type: DURABLE MEDICAL EQUIPMENT

## (undated) DEVICE — CANNULA MEDONE FLEXTIP 23G

## (undated) DEVICE — DRAPE STERI-DRAPE INCISE 13X13"

## (undated) DEVICE — AUTO GAS FILL CONSTELLATION

## (undated) DEVICE — NDL HYPO SAFE 22G X 1.5"

## (undated) DEVICE — ALCON FORCEPS SHARKSKIN ILM 25G

## (undated) DEVICE — DRSG MASTISOL

## (undated) DEVICE — SOL IRR BAL SALT + 500ML

## (undated) DEVICE — SUT VICRYL 7-0 12" TG140-8 DA

## (undated) DEVICE — BLANKET WARMER LOWER ADULT

## (undated) DEVICE — WRAP COMPRESSION CALF MED

## (undated) DEVICE — SOL TISSUE BLUE OPTH PRE-FIELD SYR STRL 0.5ML 5/BX

## (undated) DEVICE — LIGHT SHIELD CORNEAL

## (undated) DEVICE — Device

## (undated) DEVICE — GLV 8 PROTEXIS

## (undated) DEVICE — LENS DISP FLAT VIT

## (undated) DEVICE — CONSTELLATION TOTAL PLUS PAK 23G

## (undated) DEVICE — CANNULA MEDONE FLEXTIP 25G